# Patient Record
Sex: FEMALE | Race: WHITE | HISPANIC OR LATINO | Employment: UNEMPLOYED | URBAN - METROPOLITAN AREA
[De-identification: names, ages, dates, MRNs, and addresses within clinical notes are randomized per-mention and may not be internally consistent; named-entity substitution may affect disease eponyms.]

---

## 2017-02-20 ENCOUNTER — HOSPITAL ENCOUNTER (EMERGENCY)
Facility: HOSPITAL | Age: 47
Discharge: HOME | End: 2017-02-21
Attending: EMERGENCY MEDICINE

## 2017-02-20 DIAGNOSIS — M62.830 BACK SPASM: ICD-10-CM

## 2017-02-20 DIAGNOSIS — R07.9 CHEST PAIN, UNSPECIFIED TYPE: Primary | ICD-10-CM

## 2017-02-20 PROCEDURE — 99284 EMERGENCY DEPT VISIT MOD MDM: CPT | Mod: 25

## 2017-02-20 PROCEDURE — 93005 ELECTROCARDIOGRAM TRACING: CPT

## 2017-02-20 PROCEDURE — 93010 ELECTROCARDIOGRAM REPORT: CPT | Mod: ,,, | Performed by: INTERNAL MEDICINE

## 2017-02-20 NOTE — ED AVS SNAPSHOT
[facility]  [address]  [place] [address]               Samira Garcia   2017 11:41 PM   ED    Descripción:  Female : 1970   [facility]           Lacey Cuidado fue coordinado por:     Provider Role From To    Anmol Harry MD Attending Provider [id number] --      Razón de la bushra     Chest Pain           Diagnósticos de Esta Visita        Comentarios    Chest pain, unspecified type    -  Primario     Back spasm           ED Disposition     Ninguna           Lista de tareas           Información de seguimiento     Realice un seguimiento con:  [facility] - FAM[facility]    Cómo:  Llamar    Cuándo:  2017    Especialidad:  Family Medicine    Información de contacto:    [address] [place] [address]  [phone]        Recetas para recoger        Disp Refills Start End    cyclobenzaprine (FLEXERIL) 10 MG tablet 15 tablet 0 2017     Take 1 tablet (10 mg total) by mouth 3 (three) times daily as needed for Muscle spasms. - Oral    etodolac (LODINE) 300 MG Cap 30 capsule 0 2017     Take 1 capsule (300 mg total) by mouth every 8 (eight) hours as needed (pain). - Oral      [facility]     [facility] Enfermeras - Asistencia   Enfermeras registradas de [facility] pueden ayudarle a reservar ana laura bushra, proveer educación para la wilder, asesoría clínica, y otros servicios de asesoramiento.   Llame para maurizio servicio gratuito a [phone].             Medicamentos           Mensaje sobre Medicamentos     Verificar los cambios y / o adiciones a lacey régimen de medicación son los mismos que discutir con lacey médico. Si cualquiera de estos cambios o adiciones son incorrectos, por favor notifique a lacey proveedor de atención médica.        EMPEZAR a tone estos medicamentos NUEVOS        Refills    cyclobenzaprine (FLEXERIL) 10 MG tablet 0    Sig: Take 1 tablet (10 mg total) by mouth 3  (three) times daily as needed for Muscle spasms.    Categoría: Print    Vía: Oral    etodolac (LODINE) 300 MG Cap 0    Sig: Take 1 capsule (300 mg total) by mouth every 8 (eight) hours as needed (pain).    Categoría: Print    Vía: Oral      These medications were administered today        Dose Freq    aspirin tablet 325 mg 325 mg ED 1 Time    Sig: Take 1 tablet (325 mg total) by mouth ED 1 Time.    Categoría: Normal    Vía: Oral    cyclobenzaprine tablet 10 mg 10 mg ED 1 Time    Sig: Take 1 tablet (10 mg total) by mouth ED 1 Time.    Categoría: Normal    Vía: Oral           Verifique que la siguiente lista de medicamentos es ana laura representación exacta de los medicamentos que está tomando actualmente. Si no hay ningunos reportados, la lista puede estar en martínez. Si no es correcta, por favor póngase en contacto con medrano proveedor de atención médica. Lleve esta lista con usted en keke de emergencia.           Medicamentos Actuales     cyclobenzaprine (FLEXERIL) 10 MG tablet Take 1 tablet (10 mg total) by mouth 3 (three) times daily as needed for Muscle spasms.    etodolac (LODINE) 300 MG Cap Take 1 capsule (300 mg total) by mouth every 8 (eight) hours as needed (pain).           Información de referencia clínica           Yumiko signos vitales jose     Pulso                   69           Alergias     A partir del:  2/21/2017        No Known Allergies      Vacunas     Administradas en la fecha de la visita:  2/21/2017        None      ED Micro, Lab, POCT     Start Ordered       Status Ordering Provider    02/21/17 0133 02/21/17 0132  POCT urine pregnancy  Once      Final result     02/21/17 0002 02/21/17 0001  CBC auto differential  STAT      Final result     02/21/17 0002 02/21/17 0001  Comprehensive metabolic panel  STAT      Final result     02/21/17 0002 02/21/17 0001  Troponin I  Now then every 3 hours     Comments:  PLEASE REVIEW ORDER START TIME BEFORE MARKING SPECIMEN COLLECTED.   Start Status   02/21/17 0002 Final  result   02/21/17 0302 Acknowledged       Acknowledged       ED Imaging Orders     Start Ordered       Status Ordering Provider    02/21/17 0002 02/21/17 0001  X-Ray Chest PA And Lateral  1 time imaging      Final result         Instrucciones a marisabel de vernon         Contracción De Espalda [Sin Lesión] [Back Spasm, No Injury]    La contracción de los músculos de la espalda puede ocurrir luego de ana laura torcedura debora de ligamento o torcedura muscular por un movimiento de giro o ana laura agachada brusca. También la causa puede ser dormir en ana laura posición marivel o en un colchón de marivel calidad. Algunas personas responden a la tensión emocional tensando los músculos de la espalda.  El tratamiento descrito abajo generalmente contribuirá que el dolor desaparezca en 5-7 días. El dolor que continua puede requerir ana laura evaluación mayor u otro tipo de tratamiento isidro la terapia física.  A menos que usted haya tenido ana laura lesión física (por ejemplo, ana laura caída o un accidente de automóvil), no suelen pedirse radiografías (X-rays) para la evaluación inicial del dolor de espalda. Si el dolor persiste y no responde al tratamiento médico, es posible que le soliciten radiografías (X-rays) y otras pruebas más adelante.  Cuidado En [place]:  1. Es posible que necesite permanecer en cama los primeros días. Edgar, tan pronto isidro le sea posible, comience a sentarse o pararse para evitar los problemas que pueden aparecer cuando mirna está en cama mucho tiempo (debilidad de los músculos, mayor dolor y rigidez de la espalda, coágulos de ellen en las piernas).  2. Mientras esté en cama, intente buscar ana laura posición que le resulte cómoda. Lo mejor es utilizar un colchón firme. Intente acostarse de espalda con almohadas debajo de las rodillas. También puede intentar acostarse de costado con las rodillas flexionadas cerca del pecho y ana laura almohada entre las rodillas.  3. Evite estar mucho tiempo sentado. Eso causa más tensión en la parte inferior de la espalda que  estando de pie o caminando.  4. Vesta los dos primeros días después de la lesión, aplíquese ana laura COMPRESA DE HIELO sobre el área dolorida vesta 20 minutos cada 2 a 4 horas. Ello reducirá la hinchazón y el dolor. El CALOR (ana laura ducha caliente, un baño caliente o ana laura almohadilla térmica) funciona antonio para los espasmos musculares. Puede comenzar con el hielo y luego pasar al calor después de dos días. Algunos pacientes se sienten mejor alternando los tratamientos con hielo y calor. Emplee el método que mejor le resulte.  5. Puede usar acetaminofén (acetaminophen) [Tylenol] o ibuprofeno (ibuprofen) [Motrin o Advil] para controlar el dolor, a menos que le hayan recetado otro medicamento. [NOTA: Si tiene ana laura enfermedad hepática o renal crónica (chronic liver or kidney disease), o ha tenido alguna vez ana laura úlcera estomacal (stomach ulcer) o sangrado gastrointestinal (GI bleeding), consulte con medrano médico antes de tone estos medicamentos.]  6. Los estiramientos suaves ayudarán a que medrano espalda sane más rápido. Corazon esta simple rutina de 2-3 veces al día hasta que sienta que medrano espalda está mejor.  ¨ ESTIRAMIENTOS DE LA PARTE BAJA DE LA ESPALDA  § Acuéstese boca ariba con zara rodillas dobladas y ambos pies sobre el piso.  § Lentamente levante medrano rodilla izquierda hacia medrano pecho aplanando la parte baja de medrano espalda sobre el piso. Sostenga vesta 5 segundos.  § Relájese y repita el ejercicio con medrano rodilla derecha.  § Corazon 10 de estos ejercicios con cada pierna.  § Repita abrazando ambas rodillas y llevándolas hacia medrano pecho al mismo tiempo.  7. Infórmese sobre los métodos que se utilizan para levantar cosas pesadas de manera silveira y utilícelos. No levante nada que pese más de 15 libras (7 kilos) hasta que haya desaparecido el dolor.  Seguimiento  con medrano médico o en esta institución si zara síntomas no empiezan a mejorar luego de ana laura semana. Puede necesitar terapia física.  [NOTA: Si le hicieron ana laura radiografía, la va a  revisar un radiólogo. Le avisarán si encuentran algo que pueda afectar medrano atención]  Busque Prontamente Atención Médica  si algo de lo siguiente ocurre:  · El dolor empeora o se extiende a zara piernas  · Debilidad o entumecimiento en ana laura o ambas piernas  · Pérdida del control intestinal o de la vejiga  · Entumecimiento en el área de la geovanny  · Fiebre repentina superior a 100.4°F (38.0°C)  · Ardor o dolor al orinar  Date Last Reviewed: 6/1/2016  © 6374-8487 [employer]. [address] [place] [address]. Todos los derechos reservados. Esta información no pretende sustituir la atención médica profesional. Sólo medrano médico puede diagnosticar y tratar un problema de wilder.          Dolor En El Pecho, No Cardíaco [Non-Cardiac Chest Pain]    Según medrano visita de hoy, no se pudo determinar exactamente por qué siente dolor en el pecho. Medrano afección no parece seria y el dolor que siente no parece provenir del corazón. Sin embargo, en ocasiones, los síntomas de un problema daniel tardan más en aparecer. Por lo tanto, es importante que preste atención a las advertencias descritas a continuación.  Cuidados En [place]:  1. Descanse hoy y evite toda actividad agotadora.  2. [place] el medicamento que le hayan recetado según le hayan indicado.  Programe ana laura VISITA DE CONTROL con medrano médico o maurizio centro, según le indiquen, si no empieza a sentirse mejor en las próximas 24 horas.  Busque Prontamente Atención Médica  si algo de lo siguiente ocurre:  · Un cambio en el tipo de dolor: se siente diferente, se ha vuelto más luisa, dura más o comienza a esparcirse al hombro, el brazo, el jeanmarie, la mandíbula o la espalda.  · Dificultad para respirar o más dolor al respirar.  · Tos con expulsión de esputo (flema [phlegm]) de color oscuro o con ellen.  · Debilidad, mareo o desmayo.  · Fiebre de 100.4°F (38°C) o más vernon, o isidro le haya indicado medrano proveedor de atención médica.  · Dolor, enrojecimiento o hinchazón de ana laura  asad.  Date Last Reviewed: 2014  © 1834-4454 [employer], [employer]. [address] [place] [address], [place] [address]. Todos los derechos reservados. Esta información no pretende sustituir la atención médica profesional. Sólo medrano médico puede diagnosticar y tratar un problema de wilder.          Registrarse para [facility]     La activación de medrano cuenta [facility] es tan fácil isidro 1-2-3!    1) Ir a [email or web address], seleccione Registrarse Ahora, meter el código de activación y medrano fecha de nacimiento, y seleccione Próximo.    [id number]  Expires: 2017  1:56 AM      2) Crear un nombre de usuario y contraseña para usar cuando se visita [facility] en el futuro y selecciona ana laura pregunta de seguridad en keke de que pierda medrano contraseña y seleccione Próximo.    3) Introduzca medrano dirección de correo electrónico y anton cl en Registrarse!    Información Adicional  Si tiene alguna pregunta, por favor, e-mail [email or web address] o llame al [phone] para hablar con nuestro personal. Recuerde, [facility] no debe ser usada para necesidades urgentes. En keke de emergencia médica, llame al [phone].         [facility] cumple con las leyes federales aplicables de derechos civiles y no discrimina por motivos de sabina, color, origen nacional, edad, discapacidad, o sexo.        Language Assistance Services     ATTENTION: Language assistance services are available, free of charge. Please call [phone].      ATENCIÓN: Si habla español, tiene a medrano disposición servicios gratuitos de asistencia lingüística. Llame al [phone].     CHÚ Ý: N?u b?n nói Ti?ng Vi?t, có các d?ch v? h? tr? ngôn ng? mi?n phí dành cho b?n. G?i s? [phone].                      [facility] [address]  [place] [address]               Samira Garcia   2017 11:41 PM   ED    Description:  Female : 1970   Department:  [facility]           Your Care was Coordinated  By:     Provider Role From To    Anmol Harry MD Attending Provider 02/20/17 6850 --      Reason for Visit     Chest Pain           Diagnoses this Visit        Comments    Chest pain, unspecified type    -  Primary     Back spasm           ED Disposition     None           To Do List           Follow-up Information     Follow up with [facility] - FAMILY MEDIC[facility]. Call in 3 days.    Specialty:  Family Medicine    Contact information:    [place] [address]  [phone]         These Medications        Disp Refills Start End    cyclobenzaprine (FLEXERIL) 10 MG tablet 15 tablet 0 2/21/2017     Take 1 tablet (10 mg total) by mouth 3 (three) times daily as needed for Muscle spasms. - Oral    etodolac (LODINE) 300 MG Cap 30 capsule 0 2/21/2017     Take 1 capsule (300 mg total) by mouth every 8 (eight) hours as needed (pain). - Oral      [facility]     [facility] Nurse Care Line - 24/7 Assistance  Registered nurses in the [facility] provide clinical advisement, health education, appointment booking, and other advisory services.  Call for this free service at [phone].             Medications           Message regarding Medications     Verify the changes and/or additions to your medication regime listed below are the same as discussed with your clinician today.  If any of these changes or additions are incorrect, please notify your healthcare provider.        START taking these NEW medications        Refills    cyclobenzaprine (FLEXERIL) 10 MG tablet 0    Sig: Take 1 tablet (10 mg total) by mouth 3 (three) times daily as needed for Muscle spasms.    Class: Print    Route: Oral    etodolac (LODINE) 300 MG Cap 0    Sig: Take 1 capsule (300 mg total) by mouth every 8 (eight) hours as needed (pain).    Class: Print    Route: Oral      These medications were administered today        Dose Freq    aspirin tablet 325 mg 325 mg ED 1 Time    Sig: Take 1 tablet  (325 mg total) by mouth ED 1 Time.    Class: Normal    Route: Oral    cyclobenzaprine tablet 10 mg 10 mg ED 1 Time    Sig: Take 1 tablet (10 mg total) by mouth ED 1 Time.    Class: Normal    Route: Oral           Verify that the below list of medications is an accurate representation of the medications you are currently taking.  If none reported, the list may be blank. If incorrect, please contact your healthcare provider. Carry this list with you in case of emergency.           Current Medications     cyclobenzaprine (FLEXERIL) 10 MG tablet Take 1 tablet (10 mg total) by mouth 3 (three) times daily as needed for Muscle spasms.    etodolac (LODINE) 300 MG Cap Take 1 capsule (300 mg total) by mouth every 8 (eight) hours as needed (pain).           Clinical Reference Information           Your Vitals Were     Pulse                   69           Allergies as of 2/21/2017     No Known Allergies      Immunizations Administered on Date of Encounter - 2/21/2017     None      ED Micro, Lab, POCT     Start Ordered       Status Ordering Provider    02/21/17 0133 02/21/17 0132  POCT urine pregnancy  Once      Final result     02/21/17 0002 02/21/17 0001  CBC auto differential  STAT      Final result     02/21/17 0002 02/21/17 0001  Comprehensive metabolic panel  STAT      Final result     02/21/17 0002 02/21/17 0001  Troponin I  Now then every 3 hours     Comments:  PLEASE REVIEW ORDER START TIME BEFORE MARKING SPECIMEN COLLECTED.   Start Status   02/21/17 0002 Final result   02/21/17 0302 Acknowledged       Acknowledged       ED Imaging Orders     Start Ordered       Status Ordering Provider    02/21/17 0002 02/21/17 0001  X-Ray Chest PA And Lateral  1 time imaging      Final result         Discharge Instructions         Contracción De Espalda [Sin Lesión] [Back Spasm, No Injury]    La contracción de los músculos de la espalda puede ocurrir luego de ana laura torcedura debora de ligamento o torcedura muscular por un movimiento de  giro o ana laura agachada brusca. También la causa puede ser dormir en ana laura posición marivel o en un colchón de marivel calidad. Algunas personas responden a la tensión emocional tensando los músculos de la espalda.  El tratamiento descrito abajo generalmente contribuirá que el dolor desaparezca en 5-7 días. El dolor que continua puede requerir ana laura evaluación mayor u otro tipo de tratamiento isidro la terapia física.  A menos que usted haya tenido ana laura lesión física (por ejemplo, ana alura caída o un accidente de automóvil), no suelen pedirse radiografías (X-rays) para la evaluación inicial del dolor de espalda. Si el dolor persiste y no responde al tratamiento médico, es posible que le soliciten radiografías (X-rays) y otras pruebas más adelante.  Cuidado En [place]:  1. Es posible que necesite permanecer en cama los primeros días. Edgar, tan pronto isidro le sea posible, comience a sentarse o pararse para evitar los problemas que pueden aparecer cuando mirna está en cama mucho tiempo (debilidad de los músculos, mayor dolor y rigidez de la espalda, coágulos de ellen en las piernas).  2. Mientras esté en cama, intente buscar ana laura posición que le resulte cómoda. Lo mejor es utilizar un colchón firme. Intente acostarse de espalda con almohadas debajo de las rodillas. También puede intentar acostarse de costado con las rodillas flexionadas cerca del pecho y ana laura almohada entre las rodillas.  3. Evite estar mucho tiempo sentado. Eso causa más tensión en la parte inferior de la espalda que estando de pie o caminando.  4. Vesta los dos primeros días después de la lesión, aplíquese ana laura COMPRESA DE HIELO sobre el área dolorida vesta 20 minutos cada 2 a 4 horas. Ello reducirá la hinchazón y el dolor. El CALOR (ana laura ducha caliente, un baño caliente o ana laura almohadilla térmica) funciona antonio para los espasmos musculares. Puede comenzar con el hielo y luego pasar al calor después de dos días. Algunos pacientes se sienten mejor alternando los tratamientos  con hielo y calor. Emplee el método que mejor le resulte.  5. Puede usar acetaminofén (acetaminophen) [Tylenol] o ibuprofeno (ibuprofen) [Motrin o Advil] para controlar el dolor, a menos que le hayan recetado otro medicamento. [NOTA: Si tiene ana laura enfermedad hepática o renal crónica (chronic liver or kidney disease), o ha tenido alguna vez ana laura úlcera estomacal (stomach ulcer) o sangrado gastrointestinal (GI bleeding), consulte con medrano médico antes de tone estos medicamentos.]  6. Los estiramientos suaves ayudarán a que medrano espalda sane más rápido. Corazon esta simple rutina de 2-3 veces al día hasta que sienta que medrano espalda está mejor.  ¨ ESTIRAMIENTOS DE LA PARTE BAJA DE LA ESPALDA  § Acuéstese boca ariba con zara rodillas dobladas y ambos pies sobre el piso.  § Lentamente levante medrano rodilla izquierda hacia medrano pecho aplanando la parte baja de medrano espalda sobre el piso. Sostenga vesta 5 segundos.  § Relájese y repita el ejercicio con medrano rodilla derecha.  § Corazon 10 de estos ejercicios con cada pierna.  § Repita abrazando ambas rodillas y llevándolas hacia medrano pecho al mismo tiempo.  7. Infórmese sobre los métodos que se utilizan para levantar cosas pesadas de manera silveira y utilícelos. No levante nada que pese más de 15 libras (7 kilos) hasta que haya desaparecido el dolor.  Seguimiento  con medrano médico o en esta institución si zara síntomas no empiezan a mejorar luego de ana laura semana. Puede necesitar terapia física.  [NOTA: Si le hicieron ana laura radiografía, la va a revisar un radiólogo. Le avisarán si encuentran algo que pueda afectar medrano atención]  Busque Prontamente Atención Médica  si algo de lo siguiente ocurre:  · El dolor empeora o se extiende a zara piernas  · Debilidad o entumecimiento en ana laura o ambas piernas  · Pérdida del control intestinal o de la vejiga  · Entumecimiento en el área de la geovanny  · Fiebre repentina superior a 100.4°F (38.0°C)  · Ardor o dolor al orinar  Date Last Reviewed: 6/1/2016  © 1582-1337 [employer]. [address] [place] [address]. Todos los derechos reservados. Esta información no pretende sustituir la atención médica profesional. Sólo medrano médico puede diagnosticar y tratar un problema de wilder.          Dolor En El Pecho, No Cardíaco [Non-Cardiac Chest Pain]    Según medrano visita de hoy, no se pudo determinar exactamente por qué siente dolor en el pecho. Medrano afección no parece seria y el dolor que siente no parece provenir del corazón. Sin embargo, en ocasiones, los síntomas de un problema daniel tardan más en aparecer. Por lo tanto, es importante que preste atención a las advertencias descritas a continuación.  Cuidados En [place]:  1. Descanse hoy y evite toda actividad agotadora.  2. [place] el medicamento que le hayan recetado según le hayan indicado.  Programe ana laura VISITA DE CONTROL con medrano médico o maurizio centro, según le indiquen, si no empieza a sentirse mejor en las próximas 24 horas.  Busque Prontamente Atención Médica  si algo de lo siguiente ocurre:  · Un cambio en el tipo de dolor: se siente diferente, se ha vuelto más luisa, dura más o comienza a esparcirse al hombro, el brazo, el jeanmarie, la mandíbula o la espalda.  · Dificultad para respirar o más dolor al respirar.  · Tos con expulsión de esputo (flema [phlegm]) de color oscuro o con ellen.  · Debilidad, mareo o desmayo.  · Fiebre de 100.4°F (38°C) o más vernon, o isidro le haya indicado medrano proveedor de atención médica.  · Dolor, enrojecimiento o hinchazón de ana laura pierna.  Date Last Reviewed: 11/24/2014  © 5428-4150 [employer], [employer]. [address] [place] [address], [place] [address]. Todos los derechos reservados. Esta información no pretende sustituir la atención médica profesional. Sólo medrano médico puede diagnosticar y tratar un problema de wilder.          [facility] Sign-Up     Activating your [facility] account is as easy as 1-2-3!     1) Visit [email or web address], select Sign Up Now, enter this activation code and your date of  birth, then select Next.  [email or web address]  Expires: 4/7/2017  1:56 AM      2) Create a username and password to use when you visit [facility] in the future and select a security question in case you lose your password and select Next.    3) Enter your e-mail address and click Sign Up!    Additional Information  If you have questions, please e-mail [place] [email or web address] or call [phone] to talk to our [facility] staff. Remember, [facility] is NOT to be used for urgent needs. For medical emergencies, dial 911.          [facility] complies with applicable Federal civil rights laws and does not discriminate on the basis of race, color, national origin, age, disability, or sex.        Language Assistance Services     ATTENTION: Language assistance services are available, free of charge. Please call [phone].      ATENCIÓN: Si habla español, tiene a medrano disposición servicios gratuitos de asistencia lingüística. Llame al [phone].     CHÚ Ý: N?u b?n nói Ti?ng Vi?t, có các d?ch v? h? tr? ngôn ng? mi?n phí dành cho b?n. G?i s? [phone].

## 2017-02-21 VITALS
OXYGEN SATURATION: 96 % | DIASTOLIC BLOOD PRESSURE: 72 MMHG | HEART RATE: 66 BPM | TEMPERATURE: 98 F | SYSTOLIC BLOOD PRESSURE: 121 MMHG

## 2017-02-21 DIAGNOSIS — R07.9 CHEST PAIN: Primary | ICD-10-CM

## 2017-02-21 LAB
ALBUMIN SERPL BCP-MCNC: 4.1 G/DL
ALP SERPL-CCNC: 91 U/L
ALT SERPL W/O P-5'-P-CCNC: 35 U/L
ANION GAP SERPL CALC-SCNC: 14 MMOL/L
AST SERPL-CCNC: 34 U/L
B-HCG UR QL: NEGATIVE
BASOPHILS # BLD AUTO: 0.05 K/UL
BASOPHILS NFR BLD: 0.6 %
BILIRUB SERPL-MCNC: 0.4 MG/DL
BUN SERPL-MCNC: 15 MG/DL
CALCIUM SERPL-MCNC: 9.1 MG/DL
CHLORIDE SERPL-SCNC: 104 MMOL/L
CO2 SERPL-SCNC: 20 MMOL/L
CREAT SERPL-MCNC: 0.9 MG/DL
CTP QC/QA: YES
DIFFERENTIAL METHOD: ABNORMAL
EOSINOPHIL # BLD AUTO: 0.2 K/UL
EOSINOPHIL NFR BLD: 1.9 %
ERYTHROCYTE [DISTWIDTH] IN BLOOD BY AUTOMATED COUNT: 12.3 %
EST. GFR  (AFRICAN AMERICAN): >60 ML/MIN/1.73 M^2
EST. GFR  (NON AFRICAN AMERICAN): >60 ML/MIN/1.73 M^2
GLUCOSE SERPL-MCNC: 140 MG/DL
HCT VFR BLD AUTO: 42.4 %
HGB BLD-MCNC: 14.4 G/DL
LYMPHOCYTES # BLD AUTO: 2.8 K/UL
LYMPHOCYTES NFR BLD: 31.7 %
MCH RBC QN AUTO: 31 PG
MCHC RBC AUTO-ENTMCNC: 34 %
MCV RBC AUTO: 91 FL
MONOCYTES # BLD AUTO: 0.6 K/UL
MONOCYTES NFR BLD: 6.2 %
NEUTROPHILS # BLD AUTO: 5.2 K/UL
NEUTROPHILS NFR BLD: 59.5 %
PLATELET # BLD AUTO: 382 K/UL
PMV BLD AUTO: 9.7 FL
POTASSIUM SERPL-SCNC: 3.5 MMOL/L
PROT SERPL-MCNC: 8.6 G/DL
RBC # BLD AUTO: 4.65 M/UL
SODIUM SERPL-SCNC: 138 MMOL/L
TROPONIN I SERPL DL<=0.01 NG/ML-MCNC: <0.006 NG/ML
WBC # BLD AUTO: 8.81 K/UL

## 2017-02-21 PROCEDURE — 25000003 PHARM REV CODE 250: Performed by: EMERGENCY MEDICINE

## 2017-02-21 PROCEDURE — 80053 COMPREHEN METABOLIC PANEL: CPT

## 2017-02-21 PROCEDURE — 81025 URINE PREGNANCY TEST: CPT | Performed by: EMERGENCY MEDICINE

## 2017-02-21 PROCEDURE — 84484 ASSAY OF TROPONIN QUANT: CPT

## 2017-02-21 PROCEDURE — 85025 COMPLETE CBC W/AUTO DIFF WBC: CPT

## 2017-02-21 RX ORDER — CYCLOBENZAPRINE HCL 10 MG
10 TABLET ORAL
Status: COMPLETED | OUTPATIENT
Start: 2017-02-21 | End: 2017-02-21

## 2017-02-21 RX ORDER — CYCLOBENZAPRINE HCL 10 MG
10 TABLET ORAL 3 TIMES DAILY PRN
Qty: 15 TABLET | Refills: 0 | Status: SHIPPED | OUTPATIENT
Start: 2017-02-21

## 2017-02-21 RX ORDER — ETODOLAC 300 MG/1
300 CAPSULE ORAL EVERY 8 HOURS PRN
Qty: 30 CAPSULE | Refills: 0 | Status: SHIPPED | OUTPATIENT
Start: 2017-02-21

## 2017-02-21 RX ORDER — ASPIRIN 325 MG
325 TABLET ORAL
Status: COMPLETED | OUTPATIENT
Start: 2017-02-21 | End: 2017-02-21

## 2017-02-21 RX ADMIN — ASPIRIN 325 MG ORAL TABLET 325 MG: 325 PILL ORAL at 12:02

## 2017-02-21 RX ADMIN — CYCLOBENZAPRINE HYDROCHLORIDE 10 MG: 10 TABLET, FILM COATED ORAL at 12:02

## 2017-02-21 NOTE — ED TRIAGE NOTES
Pt. C/o having left sided chest pain that radiates into her left arm. The pain began tonight while the pt. Was watching television. The pt, states her left arm has been bothering her with numbness. Pt. Placed on cardiac monitor, BP and pulse oximetry monitoring. Skin PWD. Family at bedside.

## 2017-02-21 NOTE — DISCHARGE INSTRUCTIONS
Contracción De Espalda [Sin Lesión] [Back Spasm, No Injury]    La contracción de los músculos de la espalda puede ocurrir luego de ana laura torcedura debora de ligamento o torcedura muscular por un movimiento de giro o ana laura agachada brusca. También la causa puede ser dormir en ana laura posición marivel o en un colchón de marivel calidad. Algunas personas responden a la tensión emocional tensando los músculos de la espalda.  El tratamiento descrito abajo generalmente contribuirá que el dolor desaparezca en 5-7 días. El dolor que continua puede requerir ana laura evaluación mayor u otro tipo de tratamiento isidro la terapia física.  A menos que usted haya tenido ana laura lesión física (por ejemplo, ana laura caída o un accidente de automóvil), no suelen pedirse radiografías (X-rays) para la evaluación inicial del dolor de espalda. Si el dolor persiste y no responde al tratamiento médico, es posible que le soliciten radiografías (X-rays) y otras pruebas más adelante.  Cuidado En [place]:  1. Es posible que necesite permanecer en cama los primeros días. Edgar, tan pronto isidro le sea posible, comience a sentarse o pararse para evitar los problemas que pueden aparecer cuando mirna está en cama mucho tiempo (debilidad de los músculos, mayor dolor y rigidez de la espalda, coágulos de ellen en las piernas).  2. Mientras esté en cama, intente buscar ana laura posición que le resulte cómoda. Lo mejor es utilizar un colchón firme. Intente acostarse de espalda con almohadas debajo de las rodillas. También puede intentar acostarse de costado con las rodillas flexionadas cerca del pecho y ana laura almohada entre las rodillas.  3. Evite estar mucho tiempo sentado. Eso causa más tensión en la parte inferior de la espalda que estando de pie o caminando.  4. Vesta los dos primeros días después de la lesión, aplíquese ana laura COMPRESA DE HIELO sobre el área dolorida vesta 20 minutos cada 2 a 4 horas. Ello reducirá la hinchazón y el dolor. El CALOR (ana laura ducha caliente, un baño caliente  o ana laura almohadilla térmica) funciona antonio para los espasmos musculares. Puede comenzar con el hielo y luego pasar al calor después de dos días. Algunos pacientes se sienten mejor alternando los tratamientos con hielo y calor. Emplee el método que mejor le resulte.  5. Puede usar acetaminofén (acetaminophen) [Tylenol] o ibuprofeno (ibuprofen) [Motrin o Advil] para controlar el dolor, a menos que le hayan recetado otro medicamento. [NOTA: Si tiene ana laura enfermedad hepática o renal crónica (chronic liver or kidney disease), o ha tenido alguna vez ana laura úlcera estomacal (stomach ulcer) o sangrado gastrointestinal (GI bleeding), consulte con medrano médico antes de tone estos medicamentos.]  6. Los estiramientos suaves ayudarán a que medrano espalda sane más rápido. Corazon esta simple rutina de 2-3 veces al día hasta que sienta que medrano espalda está mejor.  ¨ ESTIRAMIENTOS DE LA PARTE BAJA DE LA ESPALDA  § Acuéstese boca ariba con zara rodillas dobladas y ambos pies sobre el piso.  § Lentamente levante medrano rodilla izquierda hacia medrano pecho aplanando la parte baja de medrano espalda sobre el piso. Sostenga vesta 5 segundos.  § Relájese y repita el ejercicio con medrano rodilla derecha.  § Corazon 10 de estos ejercicios con cada pierna.  § Repita abrazando ambas rodillas y llevándolas hacia medrano pecho al mismo tiempo.  7. Infórmese sobre los métodos que se utilizan para levantar cosas pesadas de manera silveira y utilícelos. No levante nada que pese más de 15 libras (7 kilos) hasta que haya desaparecido el dolor.  Seguimiento  con medrano médico o en esta institución si zara síntomas no empiezan a mejorar luego de ana laura semana. Puede necesitar terapia física.  [NOTA: Si le hicieron ana laura radiografía, la va a revisar un radiólogo. Le avisarán si encuentran algo que pueda afectar medrano atención]  Busque Prontamente Atención Médica  si algo de lo siguiente ocurre:  · El dolor empeora o se extiende a zara piernas  · Debilidad o entumecimiento en ana laura o ambas piernas  · Pérdida  del control intestinal o de la vejiga  · Entumecimiento en el área de la geovanny  · Fiebre repentina superior a 100.4°F (38.0°C)  · Ardor o dolor al orinar  Date Last Reviewed: 6/1/2016  © 1473-8841 [employer]. [address] [place] [id number]. Todos los derechos reservados. Esta información no pretende sustituir la atención médica profesional. Sólo medrano médico puede diagnosticar y tratar un problema de wilder.          Dolor En El Pecho, No Cardíaco [Non-Cardiac Chest Pain]    Según medrano visita de hoy, no se pudo determinar exactamente por qué siente dolor en el pecho. Medrano afección no parece seria y el dolor que siente no parece provenir del corazón. Sin embargo, en ocasiones, los síntomas de un problema daniel tardan más en aparecer. Por lo tanto, es importante que preste atención a las advertencias descritas a continuación.  Cuidados En [place]:  1. Descanse hoy y evite toda actividad agotadora.  2. [place] el medicamento que le hayan recetado según le hayan indicado.  Programe ana laura VISITA DE CONTROL con medrano médico o maurizio centro, según le indiquen, si no empieza a sentirse mejor en las próximas 24 horas.  Busque Prontamente Atención Médica  si algo de lo siguiente ocurre:  · Un cambio en el tipo de dolor: se siente diferente, se ha vuelto más luisa, dura más o comienza a esparcirse al hombro, el brazo, el jeanmarie, la mandíbula o la espalda.  · Dificultad para respirar o más dolor al respirar.  · Tos con expulsión de esputo (flema [phlegm]) de color oscuro o con ellen.  · Debilidad, mareo o desmayo.  · Fiebre de 100.4°F (38°C) o más vernon, o isidro le haya indicado medrano proveedor de atención médica.  · Dolor, enrojecimiento o hinchazón de ana laura pierna.  Date Last Reviewed: 11/24/2014  © 3765-4845 [employer]. [address] [place] [address], [place] [address]. Todos los derechos reservados. Esta información no pretende sustituir la atención médica profesional. Sólo medrano médico puede diagnosticar y tratar un  problema de wilder.

## 2017-02-21 NOTE — ED PROVIDER NOTES
Encounter Date: 2/20/2017       History     Chief Complaint   Patient presents with    Chest Pain     reports onset of palpitations/chest pain that she describes as throbbing. onset at 2300 while at rest.      Review of patient's allergies indicates:  No Known Allergies  HPI   Samira Garcia is a 46 y.o. female who has no past medical history on file who presents to the Emergency Department with chest pain. Symptoms began at 2300hrs sudden onset, constant, sharp, nonradiating, improving, moderate to severe. Symptoms are associated with left upper back pain, palpitations, and are not associated with shortness of breath. Symptoms are aggravated by nothing, and relieved by nothing. Patient has no prior history of similar symptoms. Pt has no past surgical history on file.      History reviewed. No pertinent past medical history.  No past medical history pertinent negatives.  History reviewed. No pertinent past surgical history.  History reviewed. No pertinent family history.  Social History   Substance Use Topics    Smoking status: Never Smoker    Smokeless tobacco: None    Alcohol use None     Review of Systems   Constitutional: Negative for chills, diaphoresis and fever.   Eyes: Negative for visual disturbance.   Respiratory: Negative for cough, chest tightness, shortness of breath and wheezing.    Cardiovascular: Positive for chest pain and palpitations. Negative for leg swelling.   Gastrointestinal: Negative for abdominal distention, abdominal pain, constipation, diarrhea, nausea and vomiting.   Genitourinary: Negative for difficulty urinating, dysuria, flank pain and pelvic pain.   Musculoskeletal: Positive for back pain. Negative for arthralgias.   Skin: Negative.    Allergic/Immunologic: Negative.    Neurological: Negative for dizziness, syncope, speech difficulty and headaches.   Hematological: Does not bruise/bleed easily.   Psychiatric/Behavioral: The patient is not nervous/anxious.        Physical Exam    Initial Vitals   BP Pulse Resp Temp SpO2   02/21/17 0033 02/20/17 2345 -- 02/21/17 0208 02/21/17 0033   143/89 69  98.4 °F (36.9 °C) 99 %     Physical Exam    Nursing note and vitals reviewed.  Constitutional: She appears well-developed and well-nourished. She is not diaphoretic. No distress.   HENT:   Head: Normocephalic and atraumatic.   Mouth/Throat: Oropharynx is clear and moist.   Eyes: Conjunctivae are normal. Pupils are equal, round, and reactive to light.   Neck: Neck supple.   Cardiovascular: Normal rate, regular rhythm and intact distal pulses.   No murmur heard.  Pulmonary/Chest: Breath sounds normal. No respiratory distress. She has no wheezes. She has no rhonchi. She has no rales. She exhibits tenderness (left lateral chest, reproducible+).   Abdominal: Soft. Bowel sounds are normal. She exhibits no distension. There is no tenderness. There is no guarding.   Musculoskeletal: Normal range of motion. She exhibits tenderness (left     para-thoracic between the spine and the scapula, with positive spasm ). She exhibits no edema.   Neurological: She is alert and oriented to person, place, and time.   Skin: Skin is warm and dry. No rash noted.   Psychiatric: She has a normal mood and affect.         ED Course   Procedures  Labs Reviewed   CBC W/ AUTO DIFFERENTIAL - Abnormal; Notable for the following:        Result Value    Platelets 382 (*)     All other components within normal limits    Narrative:     PLEASE REVIEW ORDER START TIME BEFORE MARKING SPECIMEN  COLLECTED.   COMPREHENSIVE METABOLIC PANEL - Abnormal; Notable for the following:     CO2 20 (*)     Glucose 140 (*)     Total Protein 8.6 (*)     All other components within normal limits    Narrative:     PLEASE REVIEW ORDER START TIME BEFORE MARKING SPECIMEN  COLLECTED.   TROPONIN I    Narrative:     PLEASE REVIEW ORDER START TIME BEFORE MARKING SPECIMEN  COLLECTED.   POCT URINE PREGNANCY     EKG Readings: (Independently Interpreted)   Rhythm:  Normal Sinus Rhythm. Heart Rate: 86. Ectopy: No Ectopy. Conduction: Normal. ST Segments: Normal ST Segments. T Waves: Normal. Clinical Impression: Normal Sinus Rhythm       X-Rays:   Independently Interpreted Readings:   Chest X-Ray: RUL scarring noted     Medical Decision Making:   Initial Assessment:   This is an emergent evaluation of a 46 y.o. female patient with presentation of chest pain. After my evaluation and workup, I believe this patient has nonspecific/atypical chest pain based upon history, physical exam and workup including risk stratification.  Patient's initial EKG and troponin were unremarkable for ischemia/infarction. I do not believe the patient has ACS or any major cardiac condition at this time.  The patient was managed with Flexeril, aspirin in the ED with subsequent improvement.  Patient's chest x-ray showed scarring in the right upper lobe, possibly due to old infectious versus inflammatory process.  I inquired with the patient about history of tuberculosis or other pneumonia in the past, stool which she stated that she did not know.  She is an immigrant from [facility], so that this is possible.  I advised that he needs to be followed by a regular physician, possible repeat imaging including x-ray or CAT scan.    Results, clinical impression and rx discussed with patient. Pt to call for follow up with PCP or referred physician in 2-3 days for close follow up. Pt is to return to the ED immediately for any worsening symptoms, or for any other immediate concerns. Pt expressed understanding.                     ED Course     Clinical Impression:   The primary encounter diagnosis was Chest pain, unspecified type. A diagnosis of Back spasm was also pertinent to this visit.    Disposition:   Disposition: Discharged  Condition: Stable       Anmol Harry MD  02/21/17 0352